# Patient Record
Sex: FEMALE | Race: WHITE
[De-identification: names, ages, dates, MRNs, and addresses within clinical notes are randomized per-mention and may not be internally consistent; named-entity substitution may affect disease eponyms.]

---

## 2018-07-24 ENCOUNTER — HOSPITAL ENCOUNTER (EMERGENCY)
Dept: HOSPITAL 8 - ED | Age: 33
Discharge: HOME | End: 2018-07-24
Payer: SELF-PAY

## 2018-07-24 VITALS — WEIGHT: 143.3 LBS | HEIGHT: 59 IN | BODY MASS INDEX: 28.89 KG/M2

## 2018-07-24 VITALS — SYSTOLIC BLOOD PRESSURE: 119 MMHG | DIASTOLIC BLOOD PRESSURE: 77 MMHG

## 2018-07-24 DIAGNOSIS — K20.9: Primary | ICD-10-CM

## 2018-07-24 PROCEDURE — 99283 EMERGENCY DEPT VISIT LOW MDM: CPT

## 2018-09-26 ENCOUNTER — APPOINTMENT (OUTPATIENT)
Dept: RADIOLOGY | Facility: MEDICAL CENTER | Age: 33
End: 2018-09-26
Attending: EMERGENCY MEDICINE
Payer: MEDICAID

## 2018-09-26 ENCOUNTER — HOSPITAL ENCOUNTER (EMERGENCY)
Dept: HOSPITAL 8 - ED | Age: 33
Discharge: HOME | End: 2018-09-26
Payer: MEDICAID

## 2018-09-26 ENCOUNTER — HOSPITAL ENCOUNTER (EMERGENCY)
Facility: MEDICAL CENTER | Age: 33
End: 2018-09-26
Attending: EMERGENCY MEDICINE
Payer: MEDICAID

## 2018-09-26 VITALS
TEMPERATURE: 97.9 F | RESPIRATION RATE: 14 BRPM | BODY MASS INDEX: 27.48 KG/M2 | HEART RATE: 74 BPM | OXYGEN SATURATION: 96 % | SYSTOLIC BLOOD PRESSURE: 110 MMHG | HEIGHT: 60 IN | WEIGHT: 140 LBS | DIASTOLIC BLOOD PRESSURE: 70 MMHG

## 2018-09-26 VITALS
HEIGHT: 59 IN | SYSTOLIC BLOOD PRESSURE: 125 MMHG | WEIGHT: 141.1 LBS | DIASTOLIC BLOOD PRESSURE: 84 MMHG | BODY MASS INDEX: 28.44 KG/M2

## 2018-09-26 DIAGNOSIS — M25.551: Primary | ICD-10-CM

## 2018-09-26 DIAGNOSIS — M25.551 PAIN OF RIGHT HIP JOINT: ICD-10-CM

## 2018-09-26 DIAGNOSIS — M54.41 ACUTE RIGHT-SIDED LOW BACK PAIN WITH RIGHT-SIDED SCIATICA: ICD-10-CM

## 2018-09-26 DIAGNOSIS — F17.200: ICD-10-CM

## 2018-09-26 LAB
APPEARANCE UR: CLEAR
COLOR UR AUTO: YELLOW
GLUCOSE UR QL STRIP.AUTO: NEGATIVE MG/DL
HCG UR QL: NEGATIVE
KETONES UR QL STRIP.AUTO: NEGATIVE MG/DL
LEUKOCYTE ESTERASE UR QL STRIP.AUTO: NEGATIVE
NITRITE UR QL STRIP.AUTO: NEGATIVE
PH UR STRIP.AUTO: 6 [PH]
PROT UR QL STRIP: NEGATIVE MG/DL
RBC UR QL AUTO: NEGATIVE
SP GR UR: 1.01

## 2018-09-26 PROCEDURE — 81002 URINALYSIS NONAUTO W/O SCOPE: CPT

## 2018-09-26 PROCEDURE — 700111 HCHG RX REV CODE 636 W/ 250 OVERRIDE (IP): Performed by: EMERGENCY MEDICINE

## 2018-09-26 PROCEDURE — 96372 THER/PROPH/DIAG INJ SC/IM: CPT

## 2018-09-26 PROCEDURE — 99284 EMERGENCY DEPT VISIT MOD MDM: CPT

## 2018-09-26 PROCEDURE — A9270 NON-COVERED ITEM OR SERVICE: HCPCS | Performed by: EMERGENCY MEDICINE

## 2018-09-26 PROCEDURE — 81025 URINE PREGNANCY TEST: CPT

## 2018-09-26 PROCEDURE — 73502 X-RAY EXAM HIP UNI 2-3 VIEWS: CPT | Mod: RT

## 2018-09-26 PROCEDURE — 700102 HCHG RX REV CODE 250 W/ 637 OVERRIDE(OP): Performed by: EMERGENCY MEDICINE

## 2018-09-26 RX ORDER — OXYCODONE HYDROCHLORIDE AND ACETAMINOPHEN 5; 325 MG/1; MG/1
1-2 TABLET ORAL EVERY 4 HOURS PRN
Qty: 12 TAB | Refills: 0 | Status: SHIPPED | OUTPATIENT
Start: 2018-09-26 | End: 2018-09-29

## 2018-09-26 RX ORDER — ONDANSETRON 4 MG/1
4 TABLET, ORALLY DISINTEGRATING ORAL ONCE
Status: COMPLETED | OUTPATIENT
Start: 2018-09-26 | End: 2018-09-26

## 2018-09-26 RX ORDER — HYDROMORPHONE HYDROCHLORIDE 2 MG/ML
1 INJECTION, SOLUTION INTRAMUSCULAR; INTRAVENOUS; SUBCUTANEOUS ONCE
Status: COMPLETED | OUTPATIENT
Start: 2018-09-26 | End: 2018-09-26

## 2018-09-26 RX ORDER — ONDANSETRON 2 MG/ML
4 INJECTION INTRAMUSCULAR; INTRAVENOUS ONCE
Status: DISCONTINUED | OUTPATIENT
Start: 2018-09-26 | End: 2018-09-26

## 2018-09-26 RX ORDER — KETOROLAC TROMETHAMINE 30 MG/ML
30 INJECTION, SOLUTION INTRAMUSCULAR; INTRAVENOUS ONCE
Status: COMPLETED | OUTPATIENT
Start: 2018-09-26 | End: 2018-09-26

## 2018-09-26 RX ORDER — METHYLPREDNISOLONE 4 MG/1
TABLET ORAL
Qty: 1 KIT | Refills: 0 | Status: SHIPPED | OUTPATIENT
Start: 2018-09-26

## 2018-09-26 RX ORDER — OXYCODONE HYDROCHLORIDE AND ACETAMINOPHEN 5; 325 MG/1; MG/1
1 TABLET ORAL ONCE
Status: COMPLETED | OUTPATIENT
Start: 2018-09-26 | End: 2018-09-26

## 2018-09-26 RX ADMIN — HYDROMORPHONE HYDROCHLORIDE 1 MG: 2 INJECTION INTRAMUSCULAR; INTRAVENOUS; SUBCUTANEOUS at 15:20

## 2018-09-26 RX ADMIN — KETOROLAC TROMETHAMINE 30 MG: 30 INJECTION, SOLUTION INTRAMUSCULAR at 15:44

## 2018-09-26 RX ADMIN — ONDANSETRON 4 MG: 4 TABLET, ORALLY DISINTEGRATING ORAL at 15:20

## 2018-09-26 RX ADMIN — OXYCODONE AND ACETAMINOPHEN 1 TABLET: 5; 325 TABLET ORAL at 18:15

## 2018-09-26 NOTE — ED PROVIDER NOTES
ED Provider Note    CHIEF COMPLAINT  Chief Complaint   Patient presents with   • Hip Injury       HPI  Patrica Valencia is a 33 y.o. female who presents to the emergency department complaining of hip pain.  The patient states she has had a long history of back pain and sciatica and has pain that is somewhat typical for her sciatica but in a different location.    This pain has been there for last several days.  Starts in the low back on the right side radiates towards her groin.  Worsened by movement, denies any falls or trauma.  Denies incontinence of urine or stool.  There is no history of dysuria hematuria concerning for gency and denies pregnancy.  Denies abdominal pain nausea vomiting diarrhea pregnancy.  Denies any vaginal bleeding spotting or discharge or STD risk factors.  No fevers or chills.  No injection drug abuse.  No recent febrile illness pain is worsened by movement, and palpation.  She was seen by [facility] yesterday discharged home without pain medications.    The patient denies any fevers, or chills.  She denies any injection drug abuse.  Has a history of malignancy.  No numbness tingling weakness.  Denies any other aggravating or relieving factors or associated complaints.    REVIEW OF SYSTEMS  See HPI for further details. All other systems are negative.    PAST MEDICAL HISTORY  History reviewed. No pertinent past medical history.    FAMILY HISTORY  History reviewed. No pertinent family history.    SOCIAL HISTORY  Social History     Social History   • Marital status: N/A     Spouse name: N/A   • Number of children: N/A   • Years of education: N/A     Social History Main Topics   • Smoking status: Current Every Day Smoker     Packs/day: 0.50   • Smokeless tobacco: Never Used   • Alcohol use No   • Drug use: No   • Sexual activity: Not on file     Other Topics Concern   • Not on file     Social History Narrative   • No narrative on file       SURGICAL HISTORY  History reviewed. No  "pertinent surgical history.    CURRENT MEDICATIONS  Home Medications     Reviewed by Meir Scott R.N. (Registered Nurse) on 09/26/18 at 1410  Med List Status: Complete   Medication Last Dose Status        Patient Dave Taking any Medications                       ALLERGIES  Allergies   Allergen Reactions   • Morphine    • Pcn [Penicillins]      Hives         PHYSICAL EXAM  VITAL SIGNS: /73   Pulse 85   Temp 36.6 °C (97.8 °F)   Resp 18   Ht 1.511 m (4' 11.5\")   Wt 63.5 kg (140 lb)   LMP 09/19/2018   SpO2 96%   BMI 27.80 kg/m²    Constitutional: Awake alert tearful, no acute cardiopulmonary distress.  HENT: Normocephalic, Atraumatic, Bilateral external ears normal, Oropharynx moist, No oral exudates, Nose normal.   Eyes: PERRL, EOMI, Conjunctiva normal, No discharge.   Cardiovascular: Normal heart rate, Normal rhythm, No murmurs, No rubs, No gallops.   Thorax & Lungs: Normal breath sounds, No respiratory distress,  Abdomen: Bowel sounds normal, Soft, No tenderness,  Skin: Warm, Dry, No erythema, No rash.   Back: No midline tenderness or CVA tenderness.  Paraspinal muscle spasm tenderness mostly in the right.  Musculoskeletal: Good range of motion in all major joints.  Good pulses normal sensation distally  Neurologic: Alert & oriented x 3, normal great toe raise plantarflexion and dorsiflexion of both feet.  Symmetric DTRs the ankle and patella.  Psychiatric: Affect normal,    RADIOLOGY/PROCEDURES  DX-HIP-COMPLETE - UNILATERAL 2+ RIGHT   Final Result      1.  No radiographic evidence of acute traumatic injury or bony erosion.   2.  Small periacetabular osseous lesion, likely an enthesophyte or the residuum of remote trauma. This would be an unusual appearance for either a joint body or an exostosis.            COURSE & MEDICAL DECISION MAKING  Pertinent Labs & Imaging studies reviewed. (See chart for details)  The patient's old chart is reviewed she has no previous visits here.    Regard the " possibility of sciatica this is the most likely.  At this point she has no red flags she has really no midline back pain or tenderness.  She has paraspinal muscle spasm and tenderness in the right side of her lumbar region.  Neurologically she is normal exam normal sensation, normal great toe raise normal plantarflexion and dorsiflexion normal DTRs no signs of cauda equina.  There is no red flags suggest fracture, abscess, tumor or epidural hematoma.  She is not febrile ill or toxic.    Although most the pain starts in the back she describes a shooting pain down her leg and she has pain in the groin area and pain in the hip.  Her thighs not red hot or swollen.  She does not really have tenderness in the hip joint I do not think she has a septic arthritis.  She is not febrile and she is not ill and she is not toxic.  There is no inguinal lymphadenopathy there is no skin discoloration.    She has a benign abdominal exam she is not pregnant and pregnancy test is negative.    She is treated here with some Toradol, and Dilaudid.  And reassess.  She felt much better.  I had a pregnancy test and urinalysis on.  These were also negative.    X-ray shows some chronic findings but really nothing acute at this point in the absence of trauma I do not think she needs a CT.  I do not think she has indication for MRI.  I do not think lab tests will be helpful.      At this point think her pain is either sciatica or musculoskeletal in etiology.  She will be treated conservatively with pain medications and Medrol.  She is agreeable with this plan.  She will return immediately she has more pain or fever or weakness or numbness or tingling or other concerns.      She is given primary care referral, and she is discharged in good condition    In prescribing controlled substances to this patient, I certify that I have obtained and reviewed the medical history of Patrica Valencia. I have also made a good jorge luis effort to obtain  applicable records from other providers who have treated the patient and no other records are available at this time.     I have conducted a physical exam and documented it. I have reviewed Ms. Valencia’s prescription history as maintained by the [place] Prescription Monitoring Program.     I have assessed the patient’s risk for abuse, dependency, and addiction using the validated Opioid Risk Tool available at https://www.mdcNavigatorMD.com/vcorhb-bttr-tcai-ort-narcotic-abuse.     Given the above, I believe the benefits of controlled substance therapy outweigh the risks. The reasons for prescribing controlled substances include in my professional opinion, controlled substances are the only reasonable choice for this patient because Fairly severe pain emergency department intractable to over-the-counter pain control as an outpatient.. Accordingly, I have discussed the risk and benefits, treatment plan, and alternative therapies with the patient.             The patient was noted to have elevated blood pressure while in the ER and was counseled to see their doctor within one wee to have this rechecked      [address] [address] [phone]  Schedule an appointment as soon as possible for a visit in 2 days          FINAL IMPRESSION  1. Acute right-sided low back pain with right-sided sciatica    2. Pain of right hip joint        2.   3.         Electronically signed by: Damien Barnard, 9/26/2018 3:21 PM

## 2018-09-26 NOTE — ED TRIAGE NOTES
Pt BIB EMS, no interventions PTA. Pt seen at [facility] yesterday for the same. Pt states that she woke up with R hip pain yesterday. Pt states the pain radiates down R leg. Denies trauma to the area.     Chief Complaint   Patient presents with   • Hip Injury     Pt placed in lobby, updated on triage process. Pt educated to notified RN or triage tech if changes in condition.

## 2018-09-27 NOTE — ED NOTES
Patient verbalizes understanding of pain medication, management, follow up with PCP and when to return to the ED.